# Patient Record
Sex: MALE | Race: WHITE | NOT HISPANIC OR LATINO
[De-identification: names, ages, dates, MRNs, and addresses within clinical notes are randomized per-mention and may not be internally consistent; named-entity substitution may affect disease eponyms.]

---

## 2017-05-09 ENCOUNTER — APPOINTMENT (OUTPATIENT)
Dept: OTOLARYNGOLOGY | Facility: CLINIC | Age: 77
End: 2017-05-09

## 2017-05-09 VITALS
HEART RATE: 61 BPM | SYSTOLIC BLOOD PRESSURE: 113 MMHG | HEIGHT: 71 IN | BODY MASS INDEX: 23.8 KG/M2 | WEIGHT: 170 LBS | DIASTOLIC BLOOD PRESSURE: 70 MMHG

## 2017-05-09 DIAGNOSIS — Z87.891 PERSONAL HISTORY OF NICOTINE DEPENDENCE: ICD-10-CM

## 2017-05-09 RX ORDER — LATANOPROST/PF 0.005 %
0.01 DROPS OPHTHALMIC (EYE)
Qty: 25 | Refills: 0 | Status: DISCONTINUED | COMMUNITY
Start: 2016-11-07

## 2017-05-09 RX ORDER — ATENOLOL 50 MG/1
50 TABLET ORAL
Qty: 90 | Refills: 0 | Status: DISCONTINUED | COMMUNITY
Start: 2017-03-01

## 2017-05-09 RX ORDER — LEVOTHYROXINE SODIUM 175 UG/1
175 TABLET ORAL
Refills: 0 | Status: ACTIVE | COMMUNITY

## 2017-05-09 RX ORDER — BRIMONIDINE TARTRATE, TIMOLOL MALEATE 2; 5 MG/ML; MG/ML
SOLUTION/ DROPS OPHTHALMIC
Refills: 0 | Status: DISCONTINUED | COMMUNITY

## 2017-05-09 RX ORDER — LATANOPROST/PF 0.005 %
0.01 DROPS OPHTHALMIC (EYE)
Refills: 0 | Status: ACTIVE | COMMUNITY

## 2017-05-09 RX ORDER — ATENOLOL 50 MG/1
50 TABLET ORAL
Refills: 0 | Status: ACTIVE | COMMUNITY

## 2017-05-09 RX ORDER — DORZOLAMIDE HYDROCHLORIDE 20 MG/ML
2 SOLUTION OPHTHALMIC
Refills: 0 | Status: DISCONTINUED | COMMUNITY

## 2017-05-09 RX ORDER — DORZOLAMIDE HYDROCHLORIDE 20 MG/ML
2 SOLUTION OPHTHALMIC
Qty: 10 | Refills: 0 | Status: ACTIVE | COMMUNITY
Start: 2017-02-24

## 2017-05-09 RX ORDER — ACETAMINOPHEN 500 MG/1
TABLET ORAL
Refills: 0 | Status: ACTIVE | COMMUNITY

## 2017-05-09 RX ORDER — LORAZEPAM 1 MG/1
1 TABLET ORAL
Refills: 0 | Status: ACTIVE | COMMUNITY

## 2017-05-09 RX ORDER — BRIMONIDINE TARTRATE, TIMOLOL MALEATE 2; 5 MG/ML; MG/ML
0.2-0.5 SOLUTION/ DROPS OPHTHALMIC
Qty: 5 | Refills: 0 | Status: ACTIVE | COMMUNITY
Start: 2016-11-07

## 2017-05-09 RX ORDER — OXYCODONE AND ACETAMINOPHEN 5; 325 MG/1; MG/1
5-325 TABLET ORAL
Qty: 30 | Refills: 0 | Status: DISCONTINUED | COMMUNITY
Start: 2017-03-10

## 2017-05-09 RX ORDER — LEVOTHYROXINE SODIUM 175 UG/1
175 TABLET ORAL
Qty: 30 | Refills: 0 | Status: DISCONTINUED | COMMUNITY
Start: 2016-11-18

## 2017-05-09 RX ORDER — LORAZEPAM 1 MG/1
1 TABLET ORAL
Qty: 60 | Refills: 0 | Status: DISCONTINUED | COMMUNITY
Start: 2017-03-21

## 2017-05-09 RX ORDER — AMOXICILLIN 500 MG/1
500 TABLET, FILM COATED ORAL
Qty: 4 | Refills: 0 | Status: DISCONTINUED | COMMUNITY
Start: 2017-01-30

## 2018-04-12 ENCOUNTER — APPOINTMENT (OUTPATIENT)
Dept: OPHTHALMOLOGY | Facility: CLINIC | Age: 78
End: 2018-04-12
Payer: MEDICARE

## 2018-04-12 DIAGNOSIS — H40.10X3 UNSPECIFIED OPEN-ANGLE GLAUCOMA, SEVERE STAGE: ICD-10-CM

## 2018-04-12 DIAGNOSIS — H35.3132 NONEXUDATIVE AGE-RELATED MACULAR DEGENERATION, BILATERAL, INTERMEDIATE DRY STAGE: ICD-10-CM

## 2018-04-12 DIAGNOSIS — H25.13 AGE-RELATED NUCLEAR CATARACT, BILATERAL: ICD-10-CM

## 2018-04-12 PROCEDURE — 99204 OFFICE O/P NEW MOD 45 MIN: CPT

## 2018-04-12 PROCEDURE — 92020 GONIOSCOPY: CPT

## 2018-04-12 PROCEDURE — 92225: CPT | Mod: RT

## 2018-04-12 PROCEDURE — 92250 FUNDUS PHOTOGRAPHY W/I&R: CPT

## 2018-04-12 RX ORDER — TIMOLOL MALEATE 5 MG/ML
0.5 SOLUTION OPHTHALMIC
Qty: 1 | Refills: 6 | Status: ACTIVE | COMMUNITY
Start: 2018-04-12 | End: 1900-01-01

## 2018-05-14 ENCOUNTER — APPOINTMENT (OUTPATIENT)
Dept: OTOLARYNGOLOGY | Facility: CLINIC | Age: 78
End: 2018-05-14
Payer: MEDICARE

## 2018-05-14 DIAGNOSIS — E03.9 HYPOTHYROIDISM, UNSPECIFIED: ICD-10-CM

## 2018-05-14 PROCEDURE — 99214 OFFICE O/P EST MOD 30 MIN: CPT | Mod: 25

## 2018-05-14 PROCEDURE — 31575 DIAGNOSTIC LARYNGOSCOPY: CPT

## 2018-05-14 RX ORDER — LEVOTHYROXINE SODIUM 0.17 MG/1
TABLET ORAL
Refills: 0 | Status: ACTIVE | COMMUNITY

## 2018-06-21 ENCOUNTER — APPOINTMENT (OUTPATIENT)
Dept: SLEEP CENTER | Facility: HOSPITAL | Age: 78
End: 2018-06-21

## 2019-05-14 ENCOUNTER — APPOINTMENT (OUTPATIENT)
Dept: OTOLARYNGOLOGY | Facility: CLINIC | Age: 79
End: 2019-05-14
Payer: MEDICARE

## 2019-06-19 ENCOUNTER — APPOINTMENT (OUTPATIENT)
Dept: OTOLARYNGOLOGY | Facility: CLINIC | Age: 79
End: 2019-06-19
Payer: MEDICARE

## 2019-06-19 DIAGNOSIS — C77.0 SECONDARY AND UNSPECIFIED MALIGNANT NEOPLASM OF LYMPH NODES OF HEAD, FACE AND NECK: ICD-10-CM

## 2019-06-19 PROCEDURE — 99214 OFFICE O/P EST MOD 30 MIN: CPT | Mod: 25

## 2019-06-19 PROCEDURE — 31575 DIAGNOSTIC LARYNGOSCOPY: CPT

## 2020-06-22 ENCOUNTER — APPOINTMENT (OUTPATIENT)
Dept: OTOLARYNGOLOGY | Facility: CLINIC | Age: 80
End: 2020-06-22
Payer: MEDICARE

## 2020-06-22 DIAGNOSIS — E89.0 POSTPROCEDURAL HYPOTHYROIDISM: ICD-10-CM

## 2020-06-22 DIAGNOSIS — C73 MALIGNANT NEOPLASM OF THYROID GLAND: ICD-10-CM

## 2020-06-22 PROCEDURE — 99214 OFFICE O/P EST MOD 30 MIN: CPT | Mod: 25

## 2020-06-22 PROCEDURE — 31575 DIAGNOSTIC LARYNGOSCOPY: CPT

## 2022-11-30 RX ORDER — SODIUM CHLORIDE 9 MG/ML
1000 INJECTION, SOLUTION INTRAVENOUS
Refills: 0 | Status: DISCONTINUED | OUTPATIENT
Start: 2022-12-01 | End: 2022-12-01

## 2022-11-30 NOTE — ASU PATIENT PROFILE, ADULT - NSALCOHOLLASTUSE_GEN_A_CORE_DT
Livia Up is calling back requesting a return call for clarification on lab work for patient [phone] opt 2 . Thank you! 24-Nov-2022

## 2022-11-30 NOTE — ASU PATIENT PROFILE, ADULT - NSICDXPASTSURGICALHX_GEN_ALL_CORE_FT
PAST SURGICAL HISTORY:  Diverticulitis partial colectomy, ileostomy and reversal    History of left knee replacement 2015

## 2022-11-30 NOTE — ASU PATIENT PROFILE, ADULT - FALL HARM RISK - RISK INTERVENTIONS

## 2022-12-01 ENCOUNTER — OUTPATIENT (OUTPATIENT)
Dept: OUTPATIENT SERVICES | Facility: HOSPITAL | Age: 82
LOS: 1 days | Discharge: HOME | End: 2022-12-01

## 2022-12-01 ENCOUNTER — TRANSCRIPTION ENCOUNTER (OUTPATIENT)
Age: 82
End: 2022-12-01

## 2022-12-01 VITALS
DIASTOLIC BLOOD PRESSURE: 92 MMHG | HEIGHT: 71 IN | HEART RATE: 72 BPM | OXYGEN SATURATION: 98 % | SYSTOLIC BLOOD PRESSURE: 147 MMHG | RESPIRATION RATE: 16 BRPM | TEMPERATURE: 99 F | WEIGHT: 187.39 LBS

## 2022-12-01 VITALS
RESPIRATION RATE: 16 BRPM | SYSTOLIC BLOOD PRESSURE: 147 MMHG | DIASTOLIC BLOOD PRESSURE: 92 MMHG | HEART RATE: 71 BPM | OXYGEN SATURATION: 97 % | TEMPERATURE: 98 F

## 2022-12-01 DIAGNOSIS — Z96.652 PRESENCE OF LEFT ARTIFICIAL KNEE JOINT: Chronic | ICD-10-CM

## 2022-12-01 DIAGNOSIS — K57.92 DIVERTICULITIS OF INTESTINE, PART UNSPECIFIED, WITHOUT PERFORATION OR ABSCESS WITHOUT BLEEDING: Chronic | ICD-10-CM

## 2022-12-01 DEVICE — LENS IOL TECNIS PROTEC ZCB00 19.0D
Type: IMPLANTABLE DEVICE | Site: LEFT (LEFT EYE) | Status: NON-FUNCTIONAL
Removed: 2022-12-01

## 2022-12-01 RX ORDER — TROPICAMIDE 1 %
1 DROPS OPHTHALMIC (EYE)
Refills: 0 | Status: COMPLETED | OUTPATIENT
Start: 2022-12-01 | End: 2022-12-01

## 2022-12-01 RX ORDER — CYCLOPENTOLATE HYDROCHLORIDE 10 MG/ML
1 SOLUTION/ DROPS OPHTHALMIC
Refills: 0 | Status: COMPLETED | OUTPATIENT
Start: 2022-12-01 | End: 2022-12-01

## 2022-12-01 RX ORDER — KETOROLAC TROMETHAMINE 0.5 %
1 DROPS OPHTHALMIC (EYE)
Refills: 0 | Status: COMPLETED | OUTPATIENT
Start: 2022-12-01 | End: 2022-12-01

## 2022-12-01 RX ORDER — PHENYLEPHRINE HCL 2.5 %
1 DROPS OPHTHALMIC (EYE)
Refills: 0 | Status: COMPLETED | OUTPATIENT
Start: 2022-12-01 | End: 2022-12-01

## 2022-12-01 RX ORDER — OFLOXACIN 0.3 %
1 DROPS OPHTHALMIC (EYE)
Refills: 0 | Status: COMPLETED | OUTPATIENT
Start: 2022-12-01 | End: 2022-12-01

## 2022-12-01 RX ADMIN — Medication 1 DROP(S): at 12:57

## 2022-12-01 RX ADMIN — CYCLOPENTOLATE HYDROCHLORIDE 1 DROP(S): 10 SOLUTION/ DROPS OPHTHALMIC at 12:57

## 2022-12-01 RX ADMIN — Medication 1 DROP(S): at 12:52

## 2022-12-01 RX ADMIN — Medication 1 DROP(S): at 13:01

## 2022-12-01 RX ADMIN — Medication 1 DROP(S): at 12:56

## 2022-12-01 RX ADMIN — CYCLOPENTOLATE HYDROCHLORIDE 1 DROP(S): 10 SOLUTION/ DROPS OPHTHALMIC at 12:52

## 2022-12-01 RX ADMIN — Medication 1 DROP(S): at 13:02

## 2022-12-01 RX ADMIN — Medication 1 DROP(S): at 12:51

## 2022-12-01 RX ADMIN — CYCLOPENTOLATE HYDROCHLORIDE 1 DROP(S): 10 SOLUTION/ DROPS OPHTHALMIC at 13:02

## 2022-12-01 NOTE — ASU DISCHARGE PLAN (ADULT/PEDIATRIC) - NS MD DC FALL RISK RISK
For information on Fall & Injury Prevention, visit: [email or web address] OR  [email or web address] OR  https://www.cdc.gov/steadi/patient.html

## 2022-12-02 NOTE — OPERATIVE REPORT - OPERATIVE RPOSRT DETAILS
PROCEDURE DATE: 12-01-22    OPERATION:  Phacoemulsification with lens implant, LEFT eye, plus femtolaser plus ORA Toya.    PREOPERATIVE DIAGNOSES:  Nuclear sclerotic cataract- LEFT eye    POSTOPERATIVE DIAGNOSES:  Nuclear sclerotic cataract - LEFT eye    DESCRIPTION OF PROCEDURE:  After appropriate medical clearance and informed consent was obtained, the patient was brought into the operating room in stable condition.  The patient was in the prone position for femtolaser to the left eye.  Section cup placed on the left eye and docked to the laser.  Capsulorrhexis and nuclear fragmentation was performed.  Section was removed and femto procedure was finished.  After this, the patient was brought into the operating room where MAC anesthesia was induced and the patient was prepped and draped in the usual manner for sterile ophthalmic surgery.  A Pauline speculum was placed into the eye and stabilized with two 4x4s.  Topical lidocaine 4% and Ocucoat viscoelastic was instilled over the cornea.  The surgeon sat temporally.  A paracentesis was made and intraocular lidocaine 1% was instilled into the anterior chamber.  Healon GV was then placed into the anterior chamber.    An anterior rhexis capsulotomy was then performed without complication.  Hydrodissection and hydrodelineation of the lens was then performed using BSS on a flat-tip cannula.  Phacoemulsification of the nucleus was then performed by sculpting the nucleus in half.  Healon GV was then placed into the bag and the cracker was used to split the nucleus in half.  Viscoat was then placed into the crack to enlarge the crack and to shield the endothelium.  Phacoemulsification of the 2 halves was then performed without complication.    Mechanical irrigation and aspiration was then performed to remove any remaining cortical material.  Polishing of the anterior and posterior capsules was then performed.  The bag was then inflated using Healon to separate the anterior and posterior leaflets.  A Barraquer tonometer was then used to determine that intraocular pressure of the eye was somewhere between 15 to 20 mmHg.  The ORA machine was then used to determine the correct intraocular lens implant, which was confirmed at 19.0 diopters for an ZCBOO lens.  The ORA machine was turned off and the Implant number [id number], 19.0 diopter was inserted into the bag and rotated into position.  Irrigation and aspiration of the remaining viscoelastic material was then performed with gentle tapping of the lens to ensure that no viscoelastic material was caught behind the lens.  Miochol was inserted into the eye after inserting into the eye and a round pupil was noted at the end of the case.    Careful attention to ensure that there was no wound leak was performed.  A speculum was removed.  A drop each of TobraDex and Timoptic XE was placed into the eye.  A clear shield was then taped over the eye.  The patient returned to the recovery room in stable and improved condition.

## 2022-12-12 PROBLEM — I10 ESSENTIAL (PRIMARY) HYPERTENSION: Chronic | Status: ACTIVE | Noted: 2022-12-01

## 2022-12-12 PROBLEM — E03.9 HYPOTHYROIDISM, UNSPECIFIED: Chronic | Status: ACTIVE | Noted: 2022-12-01

## 2023-01-04 ENCOUNTER — TRANSCRIPTION ENCOUNTER (OUTPATIENT)
Age: 83
End: 2023-01-04

## 2023-01-04 RX ORDER — SODIUM CHLORIDE 9 MG/ML
1000 INJECTION, SOLUTION INTRAVENOUS
Refills: 0 | Status: DISCONTINUED | OUTPATIENT
Start: 2023-01-05 | End: 2023-01-05

## 2023-01-04 NOTE — ASU PATIENT PROFILE, ADULT - VISION (WITH CORRECTIVE LENSES IF THE PATIENT USUALLY WEARS THEM):
cataract/Partially impaired: cannot see medication labels or newsprint, but can see obstacles in path, and the surrounding layout; can count fingers at arm's length

## 2023-01-04 NOTE — ASU PATIENT PROFILE, ADULT - NSICDXPASTSURGICALHX_GEN_ALL_CORE_FT
PAST SURGICAL HISTORY:  Cataract, left eye     Diverticulitis partial colectomy, ileostomy and reversal    History of left knee replacement 2015

## 2023-01-04 NOTE — ASU PATIENT PROFILE, ADULT - NS PREOP UNDERSTANDS INFO
No food from midnight, clear liquid till 08;15 tomorrow.. Wear loose comfort clothes, no jewelry, no valuables./yes

## 2023-01-04 NOTE — ASU PATIENT PROFILE, ADULT - FALL HARM RISK - RISK INTERVENTIONS

## 2023-01-05 ENCOUNTER — OUTPATIENT (OUTPATIENT)
Dept: OUTPATIENT SERVICES | Facility: HOSPITAL | Age: 83
LOS: 1 days | Discharge: HOME | End: 2023-01-05
Payer: MEDICARE

## 2023-01-05 ENCOUNTER — TRANSCRIPTION ENCOUNTER (OUTPATIENT)
Age: 83
End: 2023-01-05

## 2023-01-05 VITALS — SYSTOLIC BLOOD PRESSURE: 159 MMHG | DIASTOLIC BLOOD PRESSURE: 103 MMHG

## 2023-01-05 VITALS
SYSTOLIC BLOOD PRESSURE: 121 MMHG | TEMPERATURE: 98 F | HEART RATE: 87 BPM | RESPIRATION RATE: 16 BRPM | DIASTOLIC BLOOD PRESSURE: 76 MMHG | OXYGEN SATURATION: 98 %

## 2023-01-05 DIAGNOSIS — H26.9 UNSPECIFIED CATARACT: Chronic | ICD-10-CM

## 2023-01-05 DIAGNOSIS — Z96.652 PRESENCE OF LEFT ARTIFICIAL KNEE JOINT: Chronic | ICD-10-CM

## 2023-01-05 DIAGNOSIS — K57.92 DIVERTICULITIS OF INTESTINE, PART UNSPECIFIED, WITHOUT PERFORATION OR ABSCESS WITHOUT BLEEDING: Chronic | ICD-10-CM

## 2023-01-05 PROCEDURE — 67039 LASER TREATMENT OF RETINA: CPT

## 2023-01-05 RX ORDER — KETOROLAC TROMETHAMINE 0.5 %
1 DROPS OPHTHALMIC (EYE)
Refills: 0 | Status: COMPLETED | OUTPATIENT
Start: 2023-01-05 | End: 2023-01-05

## 2023-01-05 RX ORDER — FOLIC ACID 0.8 MG
1 TABLET ORAL
Qty: 0 | Refills: 0 | DISCHARGE

## 2023-01-05 RX ORDER — TROPICAMIDE 1 %
1 DROPS OPHTHALMIC (EYE)
Refills: 0 | Status: COMPLETED | OUTPATIENT
Start: 2023-01-05 | End: 2023-01-05

## 2023-01-05 RX ORDER — CYCLOPENTOLATE HYDROCHLORIDE 10 MG/ML
1 SOLUTION/ DROPS OPHTHALMIC
Refills: 0 | Status: COMPLETED | OUTPATIENT
Start: 2023-01-05 | End: 2023-01-05

## 2023-01-05 RX ORDER — OFLOXACIN 0.3 %
1 DROPS OPHTHALMIC (EYE)
Refills: 0 | Status: COMPLETED | OUTPATIENT
Start: 2023-01-05 | End: 2023-01-05

## 2023-01-05 RX ORDER — LEVOTHYROXINE SODIUM 125 MCG
1 TABLET ORAL
Qty: 0 | Refills: 0 | DISCHARGE

## 2023-01-05 RX ORDER — PHENYLEPHRINE HCL 2.5 %
1 DROPS OPHTHALMIC (EYE)
Refills: 0 | Status: COMPLETED | OUTPATIENT
Start: 2023-01-05 | End: 2023-01-05

## 2023-01-05 RX ADMIN — Medication 1 DROP(S): at 09:50

## 2023-01-05 RX ADMIN — Medication 1 DROP(S): at 10:00

## 2023-01-05 RX ADMIN — CYCLOPENTOLATE HYDROCHLORIDE 1 DROP(S): 10 SOLUTION/ DROPS OPHTHALMIC at 09:59

## 2023-01-05 RX ADMIN — Medication 1 DROP(S): at 09:54

## 2023-01-05 RX ADMIN — Medication 1 DROP(S): at 09:49

## 2023-01-05 RX ADMIN — CYCLOPENTOLATE HYDROCHLORIDE 1 DROP(S): 10 SOLUTION/ DROPS OPHTHALMIC at 09:54

## 2023-01-05 RX ADMIN — Medication 1 DROP(S): at 09:55

## 2023-01-05 RX ADMIN — Medication 1 DROP(S): at 09:59

## 2023-01-05 RX ADMIN — CYCLOPENTOLATE HYDROCHLORIDE 1 DROP(S): 10 SOLUTION/ DROPS OPHTHALMIC at 09:49

## 2023-01-05 NOTE — OPERATIVE REPORT - OPERATIVE RPOSRT DETAILS
Pars Plana Vitrectomy and Lensectomy     PATIENT NAME: ELLYN QUESADA    ATTENDING: Yesenia Easley    PREOPERATIVE DIAGNOSIS(ES):  Retained lens material,  horseshoe tear - right eye  PROCEDURE: Pars plana vitrectomy, pars plana lensectomy, endolaser - all of the right eye     INDICATIONS:       The patient is a 82y year old Male with a history of cataract in the operative eye which was partially removed by Dr. Aquino.     PROCEDURE:       On the day of surgery, after clearance by medicine and anesthesia, the patient was brought to the operating room in stable condition. After verifying the correct surgical site, the patient was placed in the supine position. Intravenous sedation was provided by the anesthesia team. Please see Dr. Aquino' dictation for the initiation of the cataract extraction.  A time-out was performed verifying correct patient, procedure, site, positioning and special equipment prior to starting the case.  After a pledget of tetracaine was placed in the inferonasal quadrant, a blunt philip scissors and a tissue forceps were used to make a forniceal incision. Then, a 1:1 mixture of 2% lidocaine and 0.75% Marcaine was injected in a peribulbar fashion behind the operative eye.     A 25-gauge microcannula was placed in the inferotemporal quadrant in a beveled fashion 3.5 mm posterior to the limbus. The infusion cannula was cleared of air, inserted into the microcannula, confirmed to be in the correct position within the vitreous cavity by direct visualization through the dilated pupil with the light pipe and then turned on under direct visualization.      A limbus parallel peritomy was made in the superotemporal quadrant with a relaxing incision temporally. External cautery was then used for hemostatis.  Two additional microcannulas were then placed superonasally and superotemporally.     The vitrectomy handpiece and lightpipe were inserted into the eye and the anterior vitreous was cleared from behind the lens capsule and within the anterior chamber.  Then, under indirect, wide-field magnification, a core and peripheral vitrectomy was performed. A posterior vitreous separation was performed using the vitrector and dilute kenalog. 360 degree scleral depression was performed to confirm there were no tears, breaks or holes. 2 were visible and were barricaded with endolaser. At this point, attention was turned to the retained lens material. The vitrector was used to remove the remaining lens material. The eye remained at a physiologic pressure. 360-degree scleral depression was again performed. No new tears, breaks or holes were visible.    The instruments were then removed from the eye. The superonasal and superotemporal microcannulas were removed and the sclerotomies were sutured closed using 8-0 vicryl in an interrupted fashion. The conjunctiva overlying the sclerotomies superotemporally was reapproximated and sutured closed using a buried vicryl stitch. The inferonasal microcannula and associated infusion line was also removed and sutured closed. All sclerotomies remained airtight and the eye remained at a physiologic pressure by palpation.     Please see Dr. Patrick's dictation for the remainder of the case, including placement of a sulcus IOL.

## 2023-04-13 ENCOUNTER — NON-APPOINTMENT (OUTPATIENT)
Age: 83
End: 2023-04-13

## 2023-04-13 ENCOUNTER — APPOINTMENT (OUTPATIENT)
Dept: OPHTHALMOLOGY | Facility: CLINIC | Age: 83
End: 2023-04-13
Payer: MEDICARE

## 2023-04-13 PROCEDURE — 92250 FUNDUS PHOTOGRAPHY W/I&R: CPT

## 2023-04-13 PROCEDURE — 92020 GONIOSCOPY: CPT

## 2023-04-13 PROCEDURE — 76514 ECHO EXAM OF EYE THICKNESS: CPT

## 2023-04-13 PROCEDURE — 92014 COMPRE OPH EXAM EST PT 1/>: CPT

## 2023-08-31 ENCOUNTER — APPOINTMENT (OUTPATIENT)
Dept: OPHTHALMOLOGY | Facility: CLINIC | Age: 83
End: 2023-08-31

## (undated) DEVICE — PACK CENTURION FMS ACT.9 30 BAL

## (undated) DEVICE — MARKING PEN W RULER

## (undated) DEVICE — GLV 6.5 PROTEXIS (WHITE)

## (undated) DEVICE — SOL IRR BAL SALT 500ML

## (undated) DEVICE — KNIFE ALCON STANDARD FULL HANDLE 15 DEG (PINK)

## (undated) DEVICE — APPLICATOR COTTON TIP 6"

## (undated) DEVICE — SYR LUER LOK 1CC

## (undated) DEVICE — NUCLEUS HYDRODISSECTOR PEARCE ANGLED 25G X 22MM

## (undated) DEVICE — KNIFE ALCON SLIT INTREPID CLEAR-CUT SAFETY 2.4MM

## (undated) DEVICE — Device

## (undated) DEVICE — DRAPE MICROSCOPE KNOB COVER SMALL (2 PCS)

## (undated) DEVICE — TRANSFORMER INTREPID I/A 0.3MM